# Patient Record
Sex: FEMALE | Race: WHITE | URBAN - METROPOLITAN AREA
[De-identification: names, ages, dates, MRNs, and addresses within clinical notes are randomized per-mention and may not be internally consistent; named-entity substitution may affect disease eponyms.]

---

## 2023-12-29 ENCOUNTER — LAB REQUISITION (OUTPATIENT)
Dept: LAB | Facility: LAB | Age: 23
End: 2023-12-29

## 2023-12-29 LAB — SARS-COV-2 RNA RESP QL NAA+PROBE: DETECTED

## 2023-12-29 PROCEDURE — 87635 SARS-COV-2 COVID-19 AMP PRB: CPT

## 2024-09-16 ENCOUNTER — LAB (OUTPATIENT)
Dept: LAB | Facility: LAB | Age: 24
End: 2024-09-16
Payer: COMMERCIAL

## 2024-09-16 LAB — COTININE UR QL SCN: NEGATIVE

## 2024-10-09 ENCOUNTER — OFFICE VISIT (OUTPATIENT)
Dept: URGENT CARE | Age: 24
End: 2024-10-09
Payer: COMMERCIAL

## 2024-10-09 VITALS
HEART RATE: 83 BPM | TEMPERATURE: 98.3 F | WEIGHT: 180 LBS | HEIGHT: 62 IN | SYSTOLIC BLOOD PRESSURE: 134 MMHG | DIASTOLIC BLOOD PRESSURE: 83 MMHG | BODY MASS INDEX: 33.13 KG/M2 | RESPIRATION RATE: 20 BRPM | OXYGEN SATURATION: 99 %

## 2024-10-09 DIAGNOSIS — N89.8 VAGINAL DISCHARGE: ICD-10-CM

## 2024-10-09 DIAGNOSIS — N89.8 VAGINAL IRRITATION: ICD-10-CM

## 2024-10-09 DIAGNOSIS — Z11.3 ENCOUNTER FOR SCREENING EXAMINATION FOR SEXUALLY TRANSMITTED DISEASE: ICD-10-CM

## 2024-10-09 DIAGNOSIS — N39.0 URINARY TRACT INFECTION WITHOUT HEMATURIA, SITE UNSPECIFIED: Primary | ICD-10-CM

## 2024-10-09 DIAGNOSIS — J45.20 MILD INTERMITTENT ASTHMA WITHOUT COMPLICATION (HHS-HCC): ICD-10-CM

## 2024-10-09 LAB
POC APPEARANCE, URINE: CLEAR
POC BACTERIAL VAGINITIS (RAPID): NEGATIVE
POC BILIRUBIN, URINE: NEGATIVE
POC BLOOD, URINE: NEGATIVE
POC COLOR, URINE: YELLOW
POC GLUCOSE, URINE: NEGATIVE MG/DL
POC KETONES, URINE: NEGATIVE MG/DL
POC LEUKOCYTES, URINE: ABNORMAL
POC NITRITE,URINE: NEGATIVE
POC PH, URINE: 6 PH
POC PROTEIN, URINE: NEGATIVE MG/DL
POC SPECIFIC GRAVITY, URINE: 1.02
POC UROBILINOGEN, URINE: 0.2 EU/DL
PREGNANCY TEST URINE, POC: NEGATIVE

## 2024-10-09 PROCEDURE — 87661 TRICHOMONAS VAGINALIS AMPLIF: CPT

## 2024-10-09 PROCEDURE — 87591 N.GONORRHOEAE DNA AMP PROB: CPT

## 2024-10-09 PROCEDURE — 87491 CHLMYD TRACH DNA AMP PROBE: CPT

## 2024-10-09 PROCEDURE — 87205 SMEAR GRAM STAIN: CPT

## 2024-10-09 PROCEDURE — 87086 URINE CULTURE/COLONY COUNT: CPT

## 2024-10-09 RX ORDER — ALBUTEROL SULFATE 90 UG/1
2 INHALANT RESPIRATORY (INHALATION) EVERY 6 HOURS PRN
Qty: 18 G | Refills: 0 | Status: SHIPPED | OUTPATIENT
Start: 2024-10-09 | End: 2025-10-09

## 2024-10-09 RX ORDER — ETONOGESTREL AND ETHINYL ESTRADIOL VAGINAL RING .015; .12 MG/D; MG/D
RING VAGINAL
COMMUNITY
Start: 2023-03-05

## 2024-10-09 RX ORDER — NITROFURANTOIN 25; 75 MG/1; MG/1
100 CAPSULE ORAL 2 TIMES DAILY
Qty: 10 CAPSULE | Refills: 0 | Status: SHIPPED | OUTPATIENT
Start: 2024-10-09 | End: 2024-10-14

## 2024-10-09 ASSESSMENT — ENCOUNTER SYMPTOMS
HEMATURIA: 0
CHILLS: 0
DYSURIA: 0
NAUSEA: 0
ABDOMINAL PAIN: 0
VOMITING: 0
FEVER: 0

## 2024-10-09 ASSESSMENT — PAIN SCALES - GENERAL: PAINLEVEL: 3

## 2024-10-09 NOTE — PATIENT INSTRUCTIONS
You were diagnosed with urinary tract infection.     -You have been prescribed an antibiotic.  Please finish course of antibiotics, unless otherwise told by a provider.  -We will send your urine for culture. We will call you if your antibiotic doesn't treat the bacteria that grew in the culture.   -Take antibiotic with food, yogurt, or a probiotic. I recommend taking a probiotic while taking this medication, and for 7 days after you finish it.  A probiotic is a supplement you can buy over-the-counter that helps support the good bacteria in your body while taking antibiotics. Probiotics help to avoid the side effects of antibiotics, such as diarrhea or yeast infections. It is best to take a probiotic about 2 hours after your dose of antibiotic.   -If you do not feel relief in 24-36 hours, please return or call the clinic so we can change your antibiotic if necessary  -If your symptoms worsen, go to the emergency room for evaluation  -Phenazopyridine (available over the counter as AZO Standard or as a prescription, Pyridium) is for frequency, urgency and bladder spasm relief. It contains orange dye and will stain clothing so wear old underwear while taking. It also can cause nausea if not taken with food.    - Drink a lot of fluid, 3 to 4 quarts a day. Cranberry juice is especially recommended, in addition to large amounts of water.  - Avoid caffeine, tea and carbonated beverages (Coke®, 7-Up®, etc). They tend to irritate the bladder.  - Alcohol may irritate the prostate.  - Aspirin, ibuprofen (Advil® or Motrin®) or acetaminophen (Tylenol®) may be used for temperature and/or discomfort.  -Follow up with PCP within 1 week if symptoms worsen    TO PREVENT FURTHER INFECTIONS:  -Empty the bladder often. Avoid holding urine for long periods of time.  -After a bowel movement, women should cleanse from front to back. Use each tissue only once.  -Empty the bladder before and after sexual intercourse.  -If you develop back  pain, fever, nausea (feeling sick to your stomach), vomiting, or your symptoms (problems) are no better in 3 days, return to clinic. Return sooner if you are getting worse.  -You will be notified if your urine culture is positive.     -You urine will be sent for gonorrhea, chlamydia, and trichomonas.  You will be called with any positive results if further treatment is indicated.  Use condoms every sexual encounter to protect yourself.  Follow-up with your primary care provider or gynecologist for further testing that is not offered in the urgent care setting (HIV, RPR, hepatitis).    -Do not have sex until you know your test results.  -Do not have have sex during treatment.  -Do not have sex for least 7 days after you and your partner or partners have finished treatment.  -If you get a positive result on your gonorrhea/chlamydia and trichomonas test, you will need to notify any recent sexual partners (including oral, anal, or vaginal sex) about your positive result.  They need to be checked for sexually transmitted disease even if they do not have symptoms.         SEEK FURTHER TREATMENT IF YOU:  -Develop severe back pain or lower abdominal pain.  -Unable to urinate.  -Develop chills and fever.  -Develop nausea or vomiting.  -Have continued burning or discomfort with urination.    You should follow up with your PCP or GYN if you have persistent or recurring symptoms.

## 2024-10-09 NOTE — PROGRESS NOTES
"Subjective   Patient ID: Christy Gilliland is a 24 y.o. female. They present today with a chief complaint of Vaginal irritation (Pt states for the best 3 days she has had vaginal irritation, pelvic pain, and ultimately has a concern for BV. Pt also asks if she can have a refilled prescription on her inhaler/).    History of Present Illness  -vaginal irritation for 3 days  -mild white/clear discharge which is similar to her normal discharge  -she denies dysuria, hematuria  -denies vaginal itching, genital sores  -she is sexually active, and would like tested for STI  -endorses pelvic pressure when she urinates  -denies fever, chills, nausea, vomiting, or back pain   -requesting refill of her albuterol inhaler as her is currently .  Denies SOB or difficulty breathing         Past Medical History  Allergies as of 10/09/2024    (No Known Allergies)       (Not in a hospital admission)       No past medical history on file.    No past surgical history on file.     reports that she has never smoked. She has never used smokeless tobacco.    Review of Systems  Review of Systems   Constitutional:  Negative for chills and fever.   Gastrointestinal:  Negative for abdominal pain, nausea and vomiting.   Genitourinary:  Positive for pelvic pain and vaginal discharge. Negative for dysuria, genital sores, hematuria, urgency and vaginal bleeding.        SEE HPI         Objective    Vitals:    10/09/24 0854   BP: 134/83   BP Location: Right arm   Patient Position: Sitting   BP Cuff Size: Adult   Pulse: 83   Resp: 20   Temp: 36.8 °C (98.3 °F)   TempSrc: Oral   SpO2: 99%   Weight: 81.6 kg (180 lb)   Height: 1.575 m (5' 2\")     Patient's last menstrual period was 2024 (exact date).    Physical Exam  Vitals reviewed. Exam conducted with a chaperone present.   Constitutional:       Appearance: Normal appearance.   Genitourinary:     General: Normal vulva.      Exam position: Lithotomy position.      Labia:         Right: No rash, " tenderness or lesion.         Left: No rash, tenderness or lesion.       Urethra: No urethral swelling or urethral lesion.      Vagina: No foreign body. Vaginal discharge present. No erythema.      Cervix: Discharge and erythema present. No lesion or cervical bleeding.      Comments: -cervix with mild erythema and white discharge  -mild erythema of the vaginal walls with small amount of white discharge   Neurological:      Mental Status: She is alert.       Procedures    Point of Care Test & Imaging Results from this visit  Results for orders placed or performed in visit on 10/09/24   POCT pregnancy, urine manually resulted   Result Value Ref Range    Preg Test, Ur Negative Negative   POCT UA Automated manually resulted   Result Value Ref Range    POC Color, Urine Yellow Straw, Yellow, Light-Yellow    POC Appearance, Urine Clear Clear    POC Glucose, Urine NEGATIVE NEGATIVE mg/dl    POC Bilirubin, Urine NEGATIVE NEGATIVE    POC Ketones, Urine NEGATIVE NEGATIVE mg/dl    POC Specific Gravity, Urine 1.025 1.005 - 1.035    POC Blood, Urine NEGATIVE NEGATIVE    POC PH, Urine 6.0 No Reference Range Established PH    POC Protein, Urine NEGATIVE NEGATIVE, 30 (1+) mg/dl    POC Urobilinogen, Urine 0.2 0.2, 1.0 EU/DL    Poc Nitrite, Urine NEGATIVE NEGATIVE    POC Leukocytes, Urine SMALL (1+) (A) NEGATIVE   POCT BV Blue Rapid - Bacterial Vaginitis manually resulted   Result Value Ref Range    POC Bacterial Vaginitis (Rapid) Negative Negative      No results found.    Diagnostic study results (if any) were reviewed by Cassi Lee PA-C.    Assessment/Plan   Allergies, medications, history, and pertinent labs/EKGs/Imaging reviewed by Cassi Lee PA-C.     Medical Decision Making  Clinical presentation consistent with UTI. No signs of pyelonephritis, sepsis, systemic illness, or vaginitis. Urine pregnancy negative.  UA +leukest --> consistent with UTI. Will send urine for culture and sensitivity.  Pt will be treated with  macrobid and contacted if urine culture demonstrates resistance to antibiotic selected for treatment. She is sexually active and urine sent for STI.  Rapid BV negative.  Internal exam showed some mild erythema of the cervix and vaginal walls, with small amount of white discharge which was collected and sent for yeast culture.   Requesting a refill for her albuterol inhaler, as her is .    Follow up and return precautions discussed with patient prior to discharge.      At time of discharge patient was clinically well-appearing and HDS for outpatient management. The patient and/or family was educated regarding diagnosis, supportive care, OTC and Rx medications. The patient and/or family was given the opportunity to ask questions prior to discharge.  They verbalized understanding of my discussion of the plans for treatment, expected course, indications to return to  or seek further evaluation in ED, and the need for timely follow up as directed.   They were provided with a work/school excuse if requested.    Orders and Diagnoses  Diagnoses and all orders for this visit:  Urinary tract infection without hematuria, site unspecified  -     Urine Culture  -     nitrofurantoin, macrocrystal-monohydrate, (Macrobid) 100 mg capsule; Take 1 capsule (100 mg) by mouth 2 times a day for 5 days.  Vaginal irritation  -     POCT pregnancy, urine manually resulted  -     POCT UA Automated manually resulted  -     POCT BV Blue Rapid - Bacterial Vaginitis manually resulted  -     Urine Culture  -     Trichomonas vaginalis, Amplified  -     C. trachomatis / N. gonorrhoeae, Amplified  -     Vaginitis Gram Stain For Bacterial Vaginosis + Yeast  Encounter for screening examination for sexually transmitted disease  -     Urine Culture  -     Trichomonas vaginalis, Amplified  -     C. trachomatis / N. gonorrhoeae, Amplified  Vaginal discharge  -     Vaginitis Gram Stain For Bacterial Vaginosis + Yeast  Mild intermittent asthma  without complication ([facility])  -     albuterol 90 mcg/actuation inhaler; Inhale 2 puffs every 6 hours if needed for wheezing.      Medical Admin Record      Patient disposition: Home    Electronically signed by Cassi Lee PA-C  11:14 AM

## 2024-10-10 LAB
BACTERIA UR CULT: NORMAL
C TRACH RRNA SPEC QL NAA+PROBE: NEGATIVE
CLUE CELLS VAG LPF-#/AREA: NORMAL /[LPF]
N GONORRHOEA DNA SPEC QL PROBE+SIG AMP: NEGATIVE
NUGENT SCORE: 3
T VAGINALIS RRNA SPEC QL NAA+PROBE: NEGATIVE
YEAST VAG WET PREP-#/AREA: NORMAL

## 2025-02-20 ENCOUNTER — APPOINTMENT (OUTPATIENT)
Dept: NEUROLOGY | Facility: CLINIC | Age: 25
End: 2025-02-20
Payer: COMMERCIAL

## 2025-04-28 ENCOUNTER — OFFICE VISIT (OUTPATIENT)
Dept: URGENT CARE | Age: 25
End: 2025-04-28
Payer: COMMERCIAL

## 2025-04-28 VITALS
BODY MASS INDEX: 33.13 KG/M2 | DIASTOLIC BLOOD PRESSURE: 88 MMHG | HEART RATE: 101 BPM | SYSTOLIC BLOOD PRESSURE: 138 MMHG | WEIGHT: 180 LBS | TEMPERATURE: 97.7 F | HEIGHT: 62 IN | OXYGEN SATURATION: 99 % | RESPIRATION RATE: 18 BRPM

## 2025-04-28 DIAGNOSIS — N89.8 VAGINAL DISCHARGE: Primary | ICD-10-CM

## 2025-04-28 LAB
POC APPEARANCE, URINE: CLEAR
POC BILIRUBIN, URINE: NEGATIVE
POC BLOOD, URINE: NEGATIVE
POC COLOR, URINE: YELLOW
POC GLUCOSE, URINE: NEGATIVE MG/DL
POC KETONES, URINE: NEGATIVE MG/DL
POC LEUKOCYTES, URINE: ABNORMAL
POC NITRITE,URINE: NEGATIVE
POC PH, URINE: 6 PH
POC PROTEIN, URINE: NEGATIVE MG/DL
POC SPECIFIC GRAVITY, URINE: 1.02
POC UROBILINOGEN, URINE: 0.2 EU/DL
PREGNANCY TEST URINE, POC: NEGATIVE

## 2025-04-28 PROCEDURE — 81025 URINE PREGNANCY TEST: CPT | Performed by: NURSE PRACTITIONER

## 2025-04-28 PROCEDURE — 99214 OFFICE O/P EST MOD 30 MIN: CPT | Performed by: NURSE PRACTITIONER

## 2025-04-28 PROCEDURE — 3008F BODY MASS INDEX DOCD: CPT | Performed by: NURSE PRACTITIONER

## 2025-04-28 PROCEDURE — 81003 URINALYSIS AUTO W/O SCOPE: CPT | Performed by: NURSE PRACTITIONER

## 2025-04-28 PROCEDURE — 1036F TOBACCO NON-USER: CPT | Performed by: NURSE PRACTITIONER

## 2025-04-28 RX ORDER — FLUCONAZOLE 150 MG/1
150 TABLET ORAL ONCE
Qty: 2 TABLET | Refills: 0 | Status: SHIPPED | OUTPATIENT
Start: 2025-04-28 | End: 2025-04-28

## 2025-04-28 ASSESSMENT — ENCOUNTER SYMPTOMS
ABDOMINAL PAIN: 0
NAUSEA: 0
DYSURIA: 0
FREQUENCY: 0
VOMITING: 0

## 2025-04-28 ASSESSMENT — PATIENT HEALTH QUESTIONNAIRE - PHQ9
1. LITTLE INTEREST OR PLEASURE IN DOING THINGS: NOT AT ALL
SUM OF ALL RESPONSES TO PHQ9 QUESTIONS 1 AND 2: 0
2. FEELING DOWN, DEPRESSED OR HOPELESS: NOT AT ALL

## 2025-04-28 NOTE — PROGRESS NOTES
"Subjective   Patient ID: Christy Gilliland is a 24 y.o. female. They present today with a chief complaint of Vaginal Discharge (2 days ).    History of Present Illness  HPI    Presents to urgent care for complaints of vaginal discharge for 2 days.  She states after she had intercourse with her boyfriend she developed thick yellow discharge.  She was concerned for potential yeast infection.  Patient reports she has not been screened for STI.    Past Medical History  Allergies as of 04/28/2025    (No Known Allergies)       Prescriptions Prior to Admission[1]     Medical History[2]    Surgical History[3]     reports that she has never smoked. She has never used smokeless tobacco.    Review of Systems  Review of Systems   Gastrointestinal:  Negative for abdominal pain, nausea and vomiting.   Genitourinary:  Positive for vaginal discharge. Negative for dysuria, frequency, urgency and vaginal pain.                                  Objective    Vitals:    04/28/25 1007   BP: 138/88   Pulse: 101   Resp: 18   Temp: 36.5 °C (97.7 °F)   SpO2: 99%   Weight: 81.6 kg (180 lb)   Height: 1.575 m (5' 2\")     No LMP recorded.    Physical Exam  Vitals reviewed.   Constitutional:       Appearance: Normal appearance.   HENT:      Head: Normocephalic.   Cardiovascular:      Rate and Rhythm: Normal rate and regular rhythm.      Heart sounds: Normal heart sounds.   Pulmonary:      Effort: Pulmonary effort is normal.      Breath sounds: Normal breath sounds.   Abdominal:      General: Abdomen is flat. Bowel sounds are normal.      Palpations: Abdomen is soft.      Tenderness: There is no right CVA tenderness or left CVA tenderness.   Genitourinary:     General: Normal vulva.      Labia:         Right: No rash or injury.         Left: No rash or injury.    Skin:     General: Skin is warm and dry.   Neurological:      Mental Status: She is alert.         Procedures    Point of Care Test & Imaging Results from this visit  Results for orders placed or " performed in visit on 04/28/25   POCT pregnancy, urine manually resulted   Result Value Ref Range    Preg Test, Ur Negative Negative   POCT UA Automated manually resulted   Result Value Ref Range    POC Color, Urine Yellow Straw, Yellow, Light-Yellow    POC Appearance, Urine Clear Clear    POC Glucose, Urine NEGATIVE NEGATIVE mg/dl    POC Bilirubin, Urine NEGATIVE NEGATIVE    POC Ketones, Urine NEGATIVE NEGATIVE mg/dl    POC Specific Gravity, Urine 1.025 1.005 - 1.035    POC Blood, Urine NEGATIVE NEGATIVE    POC PH, Urine 6.0 No Reference Range Established PH    POC Protein, Urine NEGATIVE NEGATIVE mg/dl    POC Urobilinogen, Urine 0.2 0.2, 1.0 EU/DL    Poc Nitrite, Urine NEGATIVE NEGATIVE    POC Leukocytes, Urine MODERATE (2+) (A) NEGATIVE      Imaging  No results found.    Cardiology, Vascular, and Other Imaging  No other imaging results found for the past 2 days      Diagnostic study results (if any) were reviewed by [facility].    Assessment/Plan   Allergies, medications, history, and pertinent labs/EKGs/Imaging reviewed by ALLISON Coker.     Medical Decision Making  Urine pregnancy was negative and urine dipstick did show 2+ leuks.  Patient denies any dysuria or frequency.  Will send urine off for culture.  Urine will also be sent out for STI testing and swab for BV/yeast.  Will start patient on Diflucan for potential yeast infection.    At time of discharge patient was clinically well-appearing and HDS for outpatient management. The patient and/or family was educated regarding diagnosis, supportive care, OTC and Rx medications. The patient and/or family was given the opportunity to ask questions prior to discharge.  They verbalized understanding of my discussion of the plans for treatment, expected course, indications to return to  or seek further evaluation in ED, and the need for timely follow up as directed.   They were provided with a work/school excuse if requested.      Orders and  Diagnoses  Diagnoses and all orders for this visit:  Vaginal discharge  -     POCT pregnancy, urine manually resulted  -     POCT UA Automated manually resulted  -     Vaginitis Gram Stain For Bacterial Vaginosis + Yeast  -     Trichomonas vaginalis, Amplified  -     C. trachomatis / N. gonorrhoeae, Amplified, Urogenital  -     fluconazole (Diflucan) 150 mg tablet; Take 1 tablet (150 mg) by mouth 1 time for 1 dose. May repeat dose in 72 hour if still symptomatic.      Medical Admin Record      Patient disposition: Home    Electronically signed by [facility]  10:27 AM           [1] (Not in a hospital admission)  [2] No past medical history on file.  [3] No past surgical history on file.

## 2025-04-29 LAB
BV SCORE VAG QL: NORMAL
C TRACH RRNA SPEC QL NAA+PROBE: NOT DETECTED
N GONORRHOEA RRNA SPEC QL NAA+PROBE: NOT DETECTED
QUEST GC CT AMPLIFIED (ALWAYS MESSAGE): NORMAL
T VAGINALIS RRNA SPEC QL NAA+PROBE: NOT DETECTED

## 2025-04-30 LAB — BACTERIA UR CULT: NORMAL

## 2025-05-23 ENCOUNTER — PHARMACY VISIT (OUTPATIENT)
Dept: PHARMACY | Facility: CLINIC | Age: 25
End: 2025-05-23
Payer: COMMERCIAL

## 2025-05-23 PROCEDURE — RXMED WILLOW AMBULATORY MEDICATION CHARGE

## 2025-05-23 RX ORDER — ETONOGESTREL AND ETHINYL ESTRADIOL VAGINAL RING .015; .12 MG/D; MG/D
RING VAGINAL
Qty: 1 EACH | Refills: 0 | OUTPATIENT
Start: 2025-05-15

## 2025-06-24 ENCOUNTER — APPOINTMENT (OUTPATIENT)
Dept: OBSTETRICS AND GYNECOLOGY | Facility: CLINIC | Age: 25
End: 2025-06-24
Payer: COMMERCIAL

## 2025-06-24 VITALS — DIASTOLIC BLOOD PRESSURE: 90 MMHG | BODY MASS INDEX: 34.35 KG/M2 | SYSTOLIC BLOOD PRESSURE: 120 MMHG | WEIGHT: 187.8 LBS

## 2025-06-24 DIAGNOSIS — Z11.3 ROUTINE SCREENING FOR STI (SEXUALLY TRANSMITTED INFECTION): Primary | ICD-10-CM

## 2025-06-24 DIAGNOSIS — Z30.09 ENCOUNTER FOR GENERAL COUNSELING AND ADVICE ON CONTRACEPTIVE MANAGEMENT: ICD-10-CM

## 2025-06-24 DIAGNOSIS — N94.10 DYSPAREUNIA IN FEMALE: ICD-10-CM

## 2025-06-24 DIAGNOSIS — Z12.4 SCREENING FOR MALIGNANT NEOPLASM OF CERVIX: ICD-10-CM

## 2025-06-24 PROCEDURE — 1036F TOBACCO NON-USER: CPT | Performed by: OBSTETRICS & GYNECOLOGY

## 2025-06-24 PROCEDURE — 87591 N.GONORRHOEAE DNA AMP PROB: CPT

## 2025-06-24 PROCEDURE — 87661 TRICHOMONAS VAGINALIS AMPLIF: CPT

## 2025-06-24 PROCEDURE — 99385 PREV VISIT NEW AGE 18-39: CPT | Performed by: OBSTETRICS & GYNECOLOGY

## 2025-06-24 PROCEDURE — 87491 CHLMYD TRACH DNA AMP PROBE: CPT

## 2025-06-24 RX ORDER — DESOGESTREL AND ETHINYL ESTRADIOL 0.15-0.03
1 KIT ORAL DAILY
Qty: 84 TABLET | Refills: 3 | Status: SHIPPED | OUTPATIENT
Start: 2025-06-24 | End: 2026-06-24

## 2025-06-24 ASSESSMENT — PATIENT HEALTH QUESTIONNAIRE - PHQ9
2. FEELING DOWN, DEPRESSED OR HOPELESS: NOT AT ALL
1. LITTLE INTEREST OR PLEASURE IN DOING THINGS: NOT AT ALL
SUM OF ALL RESPONSES TO PHQ9 QUESTIONS 1 AND 2: 0

## 2025-06-24 ASSESSMENT — PAIN SCALES - GENERAL: PAINLEVEL_OUTOF10: 0-NO PAIN

## 2025-06-24 NOTE — PROGRESS NOTES
Well Woman Visit    SUBJECTIVE  24 y.o.  female presents for well woman exam     OB/GYN History  OB History    Para Term  AB Living   0 0 0 0 0 0   SAB IAB Ectopic Multiple Live Births   0 0 0 0 0       Menstrual status: Having periods  Patient's last menstrual period was 2025.  Menses: Regular  Abnormal bleeding: Yes - having some spotting in the middle of her cycle  Discharge: No  Pelvic pain: Yes - only after intercourse  Pelvic prolapse: No  Urinary/fecal incontinence or overactive bladder: No  Breast concerns: No  Mood concerns: No    Social History     Substance and Sexual Activity   Sexual Activity Yes    Partners: Male    Birth control/protection: Ring     Sexually transmitted infections:no past history  History of abnormal pap: No  Last pap: approximate date  and was normal  Sexual concerns: Yes - feeling sore after sexual intercourse, vaginal dryness  Desire to become pregnant in the next year: No    Other: None     The following portions of the chart were reviewed this encounter and updated as appropriate:    Tobacco  Allergies  Meds  Problems  Med Hx  Surg Hx  Fam Hx          Hereditary Cancer Risk Assessment:   Family History[1]           OBJECTIVE  Vitals:    25 1318 25 1351   BP: 120/90 120/90   Weight: 85.2 kg (187 lb 12.8 oz)      Body mass index is 34.35 kg/m².      Physical Exam  Constitutional:       General: She is not in acute distress.     Appearance: Normal appearance.   Genitourinary:      Vulva and rectum normal.      Right Labia: No skin changes.     Left Labia: No skin changes.     No vaginal discharge.        Right Adnexa: not tender and no mass present.     Left Adnexa: not tender and no mass present.     No cervical lesion.      Uterus is not tender or irregular.   Breasts:     Breasts are symmetrical.      Right: Normal.      Left: Normal.   HENT:      Head: Normocephalic and atraumatic.      Nose: Nose normal.      Mouth/Throat:       Mouth: Mucous membranes are moist.      Pharynx: Oropharynx is clear.   Eyes:      Extraocular Movements: Extraocular movements intact.      Conjunctiva/sclera: Conjunctivae normal.      Pupils: Pupils are equal, round, and reactive to light.   Cardiovascular:      Rate and Rhythm: Normal rate.      Pulses: Normal pulses.   Pulmonary:      Effort: Pulmonary effort is normal.   Abdominal:      General: Abdomen is flat. There is no distension.      Palpations: Abdomen is soft.      Tenderness: There is no abdominal tenderness. There is no guarding or rebound.   Musculoskeletal:         General: Normal range of motion.   Neurological:      General: No focal deficit present.      Mental Status: She is alert and oriented to person, place, and time.   Skin:     General: Skin is warm and dry.   Psychiatric:         Mood and Affect: Mood normal.         Behavior: Behavior normal.   Vitals reviewed.          Immunization History   Administered Date(s) Administered    COVID-19, mRNA, LNP-S, PF, 30 mcg/0.3 mL dose 01/19/2022    HPV, Quadrivalent 08/31/2010, 09/21/2011, 03/01/2012    Pfizer Purple Cap SARS-CoV-2 03/20/2021, 04/10/2021         ASSESSMENT AND PLAN  -Well woman screenings performed today  -Reviewed cervical cancer screening recommendations. Gardasil received  -Discussed reproductive life plan - would like to switch to pills from Nuvaring; estrogen contraindications reviewed and denies any  -Encouraged routine wellness exams with PCP      -Issues identified and addressed today:   Problem List Items Addressed This Visit          Ob-Gyn Problems    Dyspareunia in female    Overview   - Feels like low lubrications and pain after intercourse is completed  - Exam without any point tenderness/high tone  - Switching contraceptives - will try silicone based lubricant  - 3 month follow up          Other Visit Diagnoses         Routine screening for STI (sexually transmitted infection)    -  Primary    Relevant Orders     Hepatitis C Antibody    Hepatitis B Surface Antigen    Syphilis Screen with Reflex    HIV 1/2 Antigen/Antibody Screen with Reflex to Confirmation      Screening for malignant neoplasm of cervix        Relevant Orders    THINPREP PAP TEST (25-30)      Encounter for general counseling and advice on contraceptive management        Relevant Medications    desogestrel-ethinyl estradiol (Enskyce) 0.15-0.03 mg tablet          Follow up 3 months    Winnie Banerjee MD  Obstetrics & Gynecology  06/24/25        [1]   Family History  Problem Relation Name Age of Onset    Hypertension Mother Sara     Diabetes Father Lonnie     Hypertension Father Lonnie     Cancer Maternal Grandmother Saray     Cancer Paternal Grandfather Barry     Breast cancer Neg Hx      Ovarian cancer Neg Hx      Uterine cancer Neg Hx      Colon cancer Neg Hx

## 2025-06-25 LAB
C TRACH RRNA SPEC QL NAA+PROBE: NEGATIVE
N GONORRHOEA DNA SPEC QL PROBE+SIG AMP: NEGATIVE
T VAGINALIS RRNA SPEC QL NAA+PROBE: NEGATIVE

## 2025-06-27 LAB
HBV SURFACE AG SERPL QL IA: NORMAL
HCV AB SERPL QL IA: NORMAL
HIV 1+2 AB+HIV1 P24 AG SERPL QL IA: NORMAL
HIV 1+2 AB+HIV1 P24 AG SERPL QL IA: NORMAL
T PALLIDUM AB SER QL IA: NEGATIVE

## 2025-07-18 ENCOUNTER — RESULTS FOLLOW-UP (OUTPATIENT)
Dept: OBSTETRICS AND GYNECOLOGY | Facility: HOSPITAL | Age: 25
End: 2025-07-18
Payer: COMMERCIAL

## 2025-07-18 LAB
CYTOLOGY CMNT CVX/VAG CYTO-IMP: NORMAL
LAB AP HPV GENOTYPE QUESTION: YES
LAB AP HPV HR: NORMAL
LAB AP PAP ADDITIONAL TESTS: NORMAL
LABORATORY COMMENT REPORT: NORMAL
LMP START DATE: NORMAL
PATH REPORT.TOTAL CANCER: NORMAL

## 2025-08-07 ENCOUNTER — APPOINTMENT (OUTPATIENT)
Dept: NEUROLOGY | Facility: CLINIC | Age: 25
End: 2025-08-07
Payer: COMMERCIAL

## 2025-09-02 ENCOUNTER — APPOINTMENT (OUTPATIENT)
Dept: PRIMARY CARE | Facility: CLINIC | Age: 25
End: 2025-09-02
Payer: COMMERCIAL

## 2025-09-02 PROBLEM — R51.9 HEADACHE: Status: ACTIVE | Noted: 2024-08-19

## 2025-09-02 PROBLEM — J30.2 SEASONAL ALLERGIES: Status: ACTIVE | Noted: 2024-08-19

## 2025-09-02 PROBLEM — G43.109 MIGRAINE WITH AURA AND WITHOUT STATUS MIGRAINOSUS, NOT INTRACTABLE: Status: ACTIVE | Noted: 2025-09-02

## 2025-09-02 PROBLEM — G43.909 ACUTE MIGRAINE: Status: ACTIVE | Noted: 2025-09-02

## 2025-09-02 PROBLEM — N94.10 DYSPAREUNIA IN FEMALE: Status: RESOLVED | Noted: 2025-06-24 | Resolved: 2025-09-02

## 2025-09-02 PROBLEM — L68.0 HIRSUTISM: Status: ACTIVE | Noted: 2025-09-02

## 2025-09-02 ASSESSMENT — ENCOUNTER SYMPTOMS
ANOREXIA: 0
SCALP TENDERNESS: 0
MIGRAINE HEADACHES: 1
NUMBNESS: 0
SINUS PRESSURE: 1
INSOMNIA: 0
ABDOMINAL PAIN: 0
WEAKNESS: 0
EYE PAIN: 0
EYE REDNESS: 0
NECK PAIN: 1
DIZZINESS: 0
NAUSEA: 1
TINGLING: 0
VOMITING: 0
VISUAL CHANGE: 0
SORE THROAT: 0
SWOLLEN GLANDS: 0
FACIAL SWEATING: 0
SEIZURES: 0
ABNORMAL BEHAVIOR: 0
LOSS OF BALANCE: 0
EYE WATERING: 0
WEIGHT LOSS: 0
PHOTOPHOBIA: 1
RHINORRHEA: 0
COUGH: 0
BLURRED VISION: 0
HEADACHES: 1
CLUSTER HEADACHES: 0
FEVER: 0
BACK PAIN: 0

## 2025-09-02 ASSESSMENT — PAIN SCALES - GENERAL: PAINLEVEL_OUTOF10: 0-NO PAIN

## 2025-10-27 ENCOUNTER — APPOINTMENT (OUTPATIENT)
Facility: CLINIC | Age: 25
End: 2025-10-27
Payer: COMMERCIAL

## 2025-11-11 ENCOUNTER — APPOINTMENT (OUTPATIENT)
Dept: PRIMARY CARE | Facility: CLINIC | Age: 25
End: 2025-11-11
Payer: COMMERCIAL